# Patient Record
Sex: FEMALE
[De-identification: names, ages, dates, MRNs, and addresses within clinical notes are randomized per-mention and may not be internally consistent; named-entity substitution may affect disease eponyms.]

---

## 2019-02-05 ENCOUNTER — HOSPITAL ENCOUNTER (EMERGENCY)
Dept: HOSPITAL 14 - H.ER | Age: 44
Discharge: HOME | End: 2019-02-05
Payer: SELF-PAY

## 2019-02-05 VITALS — OXYGEN SATURATION: 100 % | TEMPERATURE: 97 F | RESPIRATION RATE: 19 BRPM

## 2019-02-05 VITALS — HEART RATE: 71 BPM | DIASTOLIC BLOOD PRESSURE: 61 MMHG | SYSTOLIC BLOOD PRESSURE: 103 MMHG

## 2019-02-05 DIAGNOSIS — T78.40XA: Primary | ICD-10-CM

## 2019-02-05 NOTE — ED PDOC
HPI: Skin/Bite Injury


Time Seen by Provider: 02/05/19 11:25


History Per: Patient


Onset/Duration Of Symptoms: Days (7)


Current Symptoms Are (Timing): Still Present


Location Of Injury: Anterior: Chest, Face


Quality Of Symptoms: Itching, Swollen


Severity: Mild


Additional Complaint(s): 





Rash, itching and swelling to upper chest and face x 1 week. Had nuclear throid 

scan with iodine 1 week ago and developed sxs after having scan. Denies SOB ot 

rightness in throat. Denies fever or difficulty swallowing





Past Medical History


Vital Signs: 





                                Last Vital Signs











Temp  97 F L  02/05/19 11:01


 


Pulse  71   02/05/19 11:01


 


Resp  16   02/05/19 11:01


 


BP  103/61   02/05/19 11:01


 


Pulse Ox  99   02/05/19 11:01














- Medical History


PMH: No Chronic Diseases





- Family History


Family History: States: Unknown Family Hx





- Immunization History


Hx Tetanus Toxoid Vaccination: No (can't remember)


Hx Influenza Vaccination: Yes





- Home Medications


Home Medications: 


                                Ambulatory Orders











 Medication  Instructions  Recorded


 


Ibuprofen 1 tab PO TID PRN #30 tab 06/01/14


 


Mineral Oil/Petrolatum,White 1 gm TP TID #1 cream..g. 02/05/19





[Eucerin Creme]  


 


Prednisone 50 mg PO DAILY #5 tab 02/05/19














- Allergies


Allergies/Adverse Reactions: 


                                    Allergies











Allergy/AdvReac Type Severity Reaction Status Date / Time


 


Penicillins Allergy   Verified 02/05/19 11:35














Review of Systems


Constitutional: Negative for: Fever


ENT: Negative for: Throat Pain, Throat Swelling


Cardiovascular: Negative for: Chest Pain


Respiratory: Negative for: Shortness of Breath, Wheezing


Skin: Positive for: Rash


Neurological: Negative for: Confusion, Dizziness





Physical Exam





- Physical Exam


Appears: Positive for: Non-toxic, No Acute Distress


Skin: Positive for: Rash (Erythemetous dry rash upper chest below neck in V 

shaped distribution, no involvement of neck. Pt states subjective swelling and 

dryness to face but no swelling noted.)


ENT: Negative for: Pharyngeal Erythema, Tonsillar Swelling


Neck: Positive for: Normal (No stridor), Painless ROM


Cardiovascular/Chest: Positive for: Regular Rate, Rhythm


Respiratory: Positive for: CNT, Normal Breath Sounds





- ECG


O2 Sat by Pulse Oximetry: 99





Medical Decision Making


Medical Decision Making: 





Sxs most likely attributed to either iodine or scan. No stridor or respiratory 

distress note. SXS have been present x 1 week. Pt already taking Benadryl, will 

add prednisone and have pt f/u with PMD





Disposition





- Clinical Impression


Clinical Impression: 


 Allergic reaction








- Patient ED Disposition


Is Patient to be Admitted: No


Counseled Patient/Family Regarding: Diagnosis, Need For Followup, Rx Given





- Disposition


Disposition: Routine/Home


Disposition Time: 11:41


Condition: FAIR


Prescriptions: 


Mineral Oil/Petrolatum,White [Eucerin Creme] 1 gm TP TID #1 cream..g.


Prednisone 50 mg PO DAILY #5 tab


Instructions:  Drug Allergy


Print Language: [place]

## 2019-02-19 ENCOUNTER — HOSPITAL ENCOUNTER (OUTPATIENT)
Dept: HOSPITAL 14 - H.OPSURG | Age: 44
Discharge: HOME | End: 2019-02-19
Attending: FAMILY MEDICINE
Payer: SELF-PAY

## 2019-02-19 VITALS — BODY MASS INDEX: 26.4 KG/M2

## 2019-02-19 VITALS — DIASTOLIC BLOOD PRESSURE: 58 MMHG | SYSTOLIC BLOOD PRESSURE: 93 MMHG | HEART RATE: 71 BPM | RESPIRATION RATE: 18 BRPM

## 2019-02-19 VITALS — OXYGEN SATURATION: 100 % | TEMPERATURE: 98.2 F

## 2019-02-19 DIAGNOSIS — E04.1: Primary | ICD-10-CM

## 2019-02-20 NOTE — US
PROCEDURE:  Date of Procedure: 02/19/2019



PROCEDURE: 



1. Ultrasound guided FNA of left thyroid nodule, CPT 16823



2. Ultrasound guided FNA of RIGHT thyroid nodule, 



3. Ultrasound guidance for FNA, [id number]



Medications: 3cc 1% Lidocaine







HISTORY:

 Enlarged thyroid nodules. 



TECHNIQUE:

Following informed consent and procedure time-out, a limited 

ultrasound patient's neck confirmed the presence of a 2.4  cm complex 

solid right  thyroid nodule. Also present is a complex, predominantly 

solid left thyroid nodule measuring 1.7cm.



After the patient's neck was prepped and draped in the usual sterile 

fashion, the skin was anesthetized with 1% lidocaine.  

Ultrasound-guided fine needle aspiration was then performed of the 

dominant left thyroid nodule. A total of 5 passes were made into the 

nodule with 25 gauge needle under ultrasound guidance.  The FNA 

specimen was sent for routine pathology and genetics. 



Ultrasound guided FNA was then performed of the dominant right 

thyroid nodule.  Again 5passes were made into the nodule with 25 

gauge needle. The FNA specimen was sent for routine pathology and 

genetics. 



Post biopsy ultrasound showed no hematoma. 



IMPRESSION:





Ultrasound-guided FNA of the dominant right and left thyroid nodules.